# Patient Record
Sex: FEMALE | Race: WHITE | Employment: FULL TIME | URBAN - NONMETROPOLITAN AREA
[De-identification: names, ages, dates, MRNs, and addresses within clinical notes are randomized per-mention and may not be internally consistent; named-entity substitution may affect disease eponyms.]

---

## 2022-08-23 ENCOUNTER — OFFICE VISIT (OUTPATIENT)
Dept: PRIMARY CARE CLINIC | Age: 52
End: 2022-08-23

## 2022-08-23 VITALS
RESPIRATION RATE: 14 BRPM | WEIGHT: 180 LBS | TEMPERATURE: 98 F | HEART RATE: 74 BPM | SYSTOLIC BLOOD PRESSURE: 118 MMHG | OXYGEN SATURATION: 98 % | DIASTOLIC BLOOD PRESSURE: 70 MMHG | BODY MASS INDEX: 30.73 KG/M2 | HEIGHT: 64 IN

## 2022-08-23 DIAGNOSIS — M18.11 PRIMARY OSTEOARTHRITIS OF FIRST CARPOMETACARPAL JOINT OF RIGHT HAND: Primary | ICD-10-CM

## 2022-08-23 DIAGNOSIS — M85.60 SUBCHONDRAL CYST: ICD-10-CM

## 2022-08-23 RX ORDER — METHYLPREDNISOLONE 4 MG/1
TABLET ORAL
Qty: 1 KIT | Refills: 0 | Status: SHIPPED | OUTPATIENT
Start: 2022-08-23

## 2022-08-23 RX ORDER — ACETAMINOPHEN 500 MG
500 TABLET ORAL EVERY 6 HOURS PRN
COMMUNITY

## 2022-08-23 RX ORDER — IBUPROFEN 200 MG
200 TABLET ORAL EVERY 6 HOURS PRN
COMMUNITY

## 2022-08-23 SDOH — ECONOMIC STABILITY: FOOD INSECURITY: WITHIN THE PAST 12 MONTHS, YOU WORRIED THAT YOUR FOOD WOULD RUN OUT BEFORE YOU GOT MONEY TO BUY MORE.: NEVER TRUE

## 2022-08-23 SDOH — ECONOMIC STABILITY: FOOD INSECURITY: WITHIN THE PAST 12 MONTHS, THE FOOD YOU BOUGHT JUST DIDN'T LAST AND YOU DIDN'T HAVE MONEY TO GET MORE.: NEVER TRUE

## 2022-08-23 ASSESSMENT — SOCIAL DETERMINANTS OF HEALTH (SDOH): HOW HARD IS IT FOR YOU TO PAY FOR THE VERY BASICS LIKE FOOD, HOUSING, MEDICAL CARE, AND HEATING?: NOT HARD AT ALL

## 2022-08-23 ASSESSMENT — ENCOUNTER SYMPTOMS: COLOR CHANGE: 0

## 2022-08-23 NOTE — PROGRESS NOTES
[address]  Dept: Maci Casanova (:  1970) is a 50 y.o. female,New patient, here for evaluation of the following chief complaint(s):  No chief complaint on file. ASSESSMENT/PLAN:  1. Primary osteoarthritis of first carpometacarpal joint of right hand  -     External Referral To Orthopedic Surgery  -     methylPREDNISolone (MEDROL, COLLINS,) 4 MG tablet; Take by mouth as directed per package instructions. , Disp-1 kit, R-0Normal  2. Subchondral cyst  -     External Referral To Orthopedic Surgery  -     methylPREDNISolone (MEDROL, COLLINS,) 4 MG tablet; Take by mouth as directed per package instructions. , Disp-1 kit, R-0Normal    Return if symptoms worsen or fail to improve. : right 51.2,left 32.6  Push right 159, left 125  Pull right 101, left 89    SUBJECTIVE/OBJECTIVE:  Pt is here for hand pain. More prominent in her right hand that is her dominant hand  She has prior history of Oa of the triscaphe joint, and metacarpal joints and was advised to go to therapy or hand specialist  Today she is requesting referral to hand specialist because her discomfort and swelling is getting worse. 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 She had a prior  claim for this pain about one year ago with [facility] but claim was denied as x ry showed Primary osteoarthrosis, cysts and osteophytes. Also has B CT surgery of B hands > 15 years ago        Review of Systems   Constitutional:  Negative for activity change. Musculoskeletal:  Positive for arthralgias, joint swelling and myalgias. Right hand   Skin:  Negative for color change and rash. Neurological:  Positive for weakness. Physical Exam  Constitutional:       Appearance: Normal appearance. Cardiovascular:      Rate and Rhythm: Normal rate.       Pulses: Normal pulses. Pulmonary:      Effort: Pulmonary effort is normal.   Musculoskeletal:         General: Swelling present. Right wrist: Swelling present. Left wrist: Normal.      Right hand: Swelling, tenderness and bony tenderness present. Normal range of motion. Normal strength. Normal sensation. There is no disruption of two-point discrimination. Normal capillary refill. Comments: Swelling of triscaphe and metacarpal joint of thumb, thenar region   Skin:     General: Skin is warm and dry. Findings: No bruising or erythema. Neurological:      Mental Status: She is alert. Additional Information:    /70 (Site: Right Upper Arm, Position: Sitting)   Pulse 74   Temp 98 °F (36.7 °C) (Oral)   Resp 14   Ht 5' 4\" (1.626 m)   Wt 180 lb (81.6 kg)   LMP 08/23/2022   SpO2 98%   BMI 30.90 kg/m²     An electronic signature was used to authenticate this note.     --Osmel Aguilar, DOMINGUEZ - CNP

## 2022-11-28 ENCOUNTER — OFFICE VISIT (OUTPATIENT)
Dept: PRIMARY CARE CLINIC | Age: 52
End: 2022-11-28

## 2022-11-28 VITALS
RESPIRATION RATE: 18 BRPM | BODY MASS INDEX: 31.07 KG/M2 | DIASTOLIC BLOOD PRESSURE: 78 MMHG | WEIGHT: 182 LBS | HEIGHT: 64 IN | SYSTOLIC BLOOD PRESSURE: 118 MMHG | HEART RATE: 84 BPM | OXYGEN SATURATION: 98 %

## 2022-11-28 DIAGNOSIS — K02.9 DENTAL CARIES: Primary | ICD-10-CM

## 2022-11-28 DIAGNOSIS — K02.9 TOOTH DECAY: ICD-10-CM

## 2022-11-28 RX ORDER — PENICILLIN V POTASSIUM 500 MG/1
500 TABLET ORAL 3 TIMES DAILY
Qty: 21 TABLET | Refills: 0 | Status: SHIPPED | OUTPATIENT
Start: 2022-11-28 | End: 2022-12-05

## 2022-11-28 ASSESSMENT — ENCOUNTER SYMPTOMS
FACIAL SWELLING: 1
RESPIRATORY NEGATIVE: 1
SORE THROAT: 0

## 2022-11-28 NOTE — PATIENT INSTRUCTIONS
Rinse mouth frequently to cleanse   Over the counter anti inflammatory for pain swelling    Warm compress may help pain  ATB as prescribed  Dental provider Thursday

## 2022-11-28 NOTE — PROGRESS NOTES
[address]  Dept: [phone]  Dept Fax: [phone]  Loc Appt: [phone]  Loc Fax: [phone]    Reagan Mclaughlin is a 50 y.o. female who presents today for her medical conditions/complaints as noted below. Chief Complaint   Patient presents with    Dental Pain     Tooth pain, sees Dentist on Thursday           HPI:     [place] is here for tooth pain upper and lower molars  Recurrent tooth problem with decay  She tried calling her dental provider they are not able to see her until Thursday  Sees dentis in [facility]          Current Outpatient Medications   Medication Sig Dispense Refill    penicillin v potassium (VEETID) 500 MG tablet Take 1 tablet by mouth 3 times daily for 7 days 21 tablet 0    ibuprofen (ADVIL;MOTRIN) 200 MG tablet Take 200 mg by mouth every 6 hours as needed for Pain      acetaminophen (TYLENOL) 500 MG tablet Take 500 mg by mouth every 6 hours as needed for Pain      methylPREDNISolone (MEDROL, COLLINS,) 4 MG tablet Take by mouth as directed per package instructions. (Patient not taking: Reported on 11/28/2022) 1 kit 0     No current facility-administered medications for this visit. No Known Allergies    Subjective:      Review of Systems   Constitutional:  Negative for chills, diaphoresis and fever. HENT:  Positive for dental problem and facial swelling. Negative for ear discharge, ear pain, mouth sores and sore throat. Respiratory: Negative. Cardiovascular: Negative. Hematological:  Negative for adenopathy. Objective:     /78 (Site: Right Upper Arm, Position: Sitting, Cuff Size: Large Adult)   Pulse 84   Resp 18   Ht 5' 4\" (1.626 m)   Wt 182 lb (82.6 kg)   SpO2 98%   BMI 31.24 kg/m²     Physical Exam  Constitutional:       Appearance: Normal appearance. She is not ill-appearing. HENT:      Head: Normocephalic. Nose: No congestion. Mouth/Throat:      Mouth: No oral lesions or angioedema. Dentition: Dental tenderness, gingival swelling and dental caries present. Palate: No lesions. Pharynx: Oropharynx is clear. Eyes:      Pupils: Pupils are equal, round, and reactive to light. Neck:        Comments: palpable swelling  Musculoskeletal:      Cervical back: Full passive range of motion without pain. Lymphadenopathy:      Cervical: No cervical adenopathy. Neurological:      Mental Status: She is alert. Assessment:      1. Dental caries    2. Tooth decay    Started pt on dental care: mouth rinse suggested and PCN  Follow up with dentist Thursday or sooner for any worsening of s/s or seek emergent care for imaging and evaluation if any fever, chills increased facial swelling etc.         Plan:     Dental caries with tooth pain and swelling. + decay upper and lower molars right  Since she reports not able to see dentist will start Atb prophylaxis      Discussed use, benefit, and side effects of prescribed medications. Barriers to medication compliance addressed. All patient questions answered. Pt voiced understanding. Return thursday with dental provider. No orders of the defined types were placed in this encounter.     Orders Placed This Encounter   Medications    penicillin v potassium (VEETID) 500 MG tablet     Sig: Take 1 tablet by mouth 3 times daily for 7 days     Dispense:  21 tablet     Refill:  0           Electronically signed by DOMINGUEZ Ortiz CNP on 11/28/2022 at 1:06 PM

## 2023-01-11 ENCOUNTER — OFFICE VISIT (OUTPATIENT)
Dept: PRIMARY CARE CLINIC | Age: 53
End: 2023-01-11

## 2023-01-11 VITALS
SYSTOLIC BLOOD PRESSURE: 120 MMHG | HEIGHT: 64 IN | DIASTOLIC BLOOD PRESSURE: 72 MMHG | HEART RATE: 74 BPM | BODY MASS INDEX: 30.9 KG/M2 | TEMPERATURE: 98.3 F | OXYGEN SATURATION: 98 % | WEIGHT: 181 LBS | RESPIRATION RATE: 18 BRPM

## 2023-01-11 DIAGNOSIS — J00 ACUTE NASOPHARYNGITIS: Primary | ICD-10-CM

## 2023-01-11 ASSESSMENT — ENCOUNTER SYMPTOMS
RHINORRHEA: 1
GASTROINTESTINAL NEGATIVE: 1
SORE THROAT: 1
RESPIRATORY NEGATIVE: 1
EYES NEGATIVE: 1
SINUS PRESSURE: 1
TROUBLE SWALLOWING: 0
VOICE CHANGE: 0
FACIAL SWELLING: 0

## 2023-01-11 NOTE — PROGRESS NOTES
Iman Skaggs (:  1970) is a 50 y.o. female,Established patient, here for evaluation of the following chief complaint(s):  Sinus Problem (Started on Saturday, has tried tylenol cold/flu with no relief, productive cough with clear drainage, denies fever/chills)         ASSESSMENT/PLAN:  1. Acute nasopharyngitis    Return in about 2 weeks (around 2023), or Followup in 2 weeks if not feeling better or sooner if feeling worst.         Subjective   SUBJECTIVE/OBJECTIVE:  Patient presents to clinic with c/o not feeling well. Patient reports cough and runny nose started over the weekend, and phlegm and chest congestion started today. Patient reports her furnace stopped working and during repair it blew out a lot of soot. Also, patient expressed concerns for visiting 75 y/o mom during this period of illness. Patient repots she has treated her s/s with an antihistamine (generic) which she takes daily      Review of Systems   Constitutional:  Positive for fatigue. Negative for activity change, appetite change, chills, diaphoresis, fever and unexpected weight change. HENT:  Positive for congestion, postnasal drip, rhinorrhea, sinus pressure and sore throat (patient reports throat is scratchy). Negative for dental problem, drooling, ear discharge, ear pain, facial swelling, hearing loss, mouth sores, nosebleeds, sneezing, tinnitus, trouble swallowing and voice change. Eyes: Negative. Respiratory: Negative. Cardiovascular: Negative. Gastrointestinal: Negative. Endocrine: Negative. Genitourinary: Negative. Musculoskeletal: Negative. Skin: Negative. Allergic/Immunologic: Positive for environmental allergies. Negative for food allergies and immunocompromised state. Neurological: Negative. Hematological: Negative. Psychiatric/Behavioral: Negative. Objective   Physical Exam  Vitals and nursing note reviewed. HENT:      Head: Normocephalic.       Right Ear: Tympanic membrane, ear canal and external ear normal. There is no impacted cerumen. Left Ear: Tympanic membrane, ear canal and external ear normal. There is no impacted cerumen. Nose: Rhinorrhea present. Right Sinus: No maxillary sinus tenderness or frontal sinus tenderness. Left Sinus: No maxillary sinus tenderness or frontal sinus tenderness. Mouth/Throat:      Mouth: Mucous membranes are moist.      Pharynx: Posterior oropharyngeal erythema present. Tonsils: 1+ on the right. 1+ on the left. Comments: +PND  Eyes:      Conjunctiva/sclera: Conjunctivae normal.      Pupils: Pupils are equal, round, and reactive to light. Cardiovascular:      Rate and Rhythm: Normal rate and regular rhythm. Pulses: Normal pulses. Heart sounds: Normal heart sounds. Pulmonary:      Effort: Pulmonary effort is normal.      Breath sounds: Normal breath sounds. Abdominal:      General: Bowel sounds are normal.      Palpations: Abdomen is soft. Tenderness: There is no right CVA tenderness or left CVA tenderness. Musculoskeletal:         General: Normal range of motion. Cervical back: No tenderness. Lymphadenopathy:      Cervical: No cervical adenopathy. Skin:     General: Skin is warm and dry. Neurological:      General: No focal deficit present. Mental Status: She is alert and oriented to person, place, and time. Psychiatric:         Mood and Affect: Mood normal.         Behavior: Behavior normal.         Thought Content: Thought content normal.         Judgment: Judgment normal.                An electronic signature was used to authenticate this note.     --Colten Narvaez, DOMINGUEZ - CNP

## 2023-02-02 ENCOUNTER — OFFICE VISIT (OUTPATIENT)
Dept: PRIMARY CARE CLINIC | Age: 53
End: 2023-02-02

## 2023-02-02 VITALS
HEART RATE: 74 BPM | OXYGEN SATURATION: 99 % | BODY MASS INDEX: 30.9 KG/M2 | SYSTOLIC BLOOD PRESSURE: 114 MMHG | DIASTOLIC BLOOD PRESSURE: 72 MMHG | RESPIRATION RATE: 16 BRPM | TEMPERATURE: 97.8 F | HEIGHT: 64 IN | WEIGHT: 181 LBS

## 2023-02-02 DIAGNOSIS — J06.9 UPPER RESPIRATORY TRACT INFECTION, UNSPECIFIED TYPE: Primary | ICD-10-CM

## 2023-02-02 RX ORDER — BENZONATATE 100 MG/1
100 CAPSULE ORAL 3 TIMES DAILY PRN
Qty: 15 CAPSULE | Refills: 0 | Status: SHIPPED | OUTPATIENT
Start: 2023-02-02 | End: 2023-02-07

## 2023-02-02 RX ORDER — FLUCONAZOLE 150 MG/1
150 TABLET ORAL ONCE
Qty: 1 TABLET | Refills: 0 | Status: SHIPPED | OUTPATIENT
Start: 2023-02-02 | End: 2023-02-02

## 2023-02-02 RX ORDER — AZITHROMYCIN 250 MG/1
250 TABLET, FILM COATED ORAL SEE ADMIN INSTRUCTIONS
Qty: 6 TABLET | Refills: 0 | Status: SHIPPED | OUTPATIENT
Start: 2023-02-02 | End: 2023-02-07

## 2023-02-02 ASSESSMENT — ENCOUNTER SYMPTOMS
EYES NEGATIVE: 1
SINUS PRESSURE: 1
RESPIRATORY NEGATIVE: 1
TROUBLE SWALLOWING: 0
GASTROINTESTINAL NEGATIVE: 1
FACIAL SWELLING: 0
RHINORRHEA: 1
VOICE CHANGE: 0
SORE THROAT: 1

## 2023-02-02 NOTE — PROGRESS NOTES
Millie Norman (:  1970) is a 50 y.o. female,Established patient, here for evaluation of the following chief complaint(s):  Otalgia (Earache, sinus congestion/drainage. Was in here on  and had a couple days of relief. Symptoms started again yesterday )         ASSESSMENT/PLAN:  1. Upper respiratory tract infection, unspecified type  -     benzonatate (TESSALON PERLES) 100 MG capsule; Take 1 capsule by mouth 3 times daily as needed for Cough, Disp-15 capsule, R-0Normal  -     azithromycin (ZITHROMAX) 250 MG tablet; Take 1 tablet by mouth See Admin Instructions for 5 days 500mg on day 1 followed by 250mg on days 2 - 5, Disp-6 tablet, R-0Normal  -     POCT rapid strep A  -     fluconazole (DIFLUCAN) 150 MG tablet; Take 1 tablet by mouth once for 1 dose, Disp-1 tablet, R-0Normal    Return if symptoms worsen or fail to improve. Subjective   SUBJECTIVE/OBJECTIVE:  Patient presents to clinic with c/o not feeling well. Patient reports cough and runny nose started over the weekend, Clear phlegm and chest congestion started today. Sore throat, scratchy throat and sinus congestion with yellow nasal discharge. Has had bloody nasal discharge x 1    Had similar s/s 2 weeks ago ws seen advised to follow up if not improving in 2 weeks. She reports that her s/s seemed to be resolving but has been working alongside coworkers who are ill with similar s/s and symptoms returned worsening over 3-4 days. Patient repots she has treated her s/s with an antihistamine (generic) which she takes daily    Otalgia   Associated symptoms include rhinorrhea and a sore throat (patient reports throat is scratchy). Pertinent negatives include no ear discharge or hearing loss. tonsilllectomy, 2 weeks history of symptoms waxing and waning     Review of Systems   Constitutional:  Positive for fatigue. Negative for activity change, appetite change, chills, diaphoresis, fever and unexpected weight change.    HENT:  Positive for congestion, ear pain, postnasal drip, rhinorrhea, sinus pressure and sore throat (patient reports throat is scratchy). Negative for dental problem, drooling, ear discharge, facial swelling, hearing loss, mouth sores, nosebleeds, sneezing, tinnitus, trouble swallowing and voice change. Eyes: Negative. Respiratory: Negative. Cardiovascular: Negative. Gastrointestinal: Negative. Endocrine: Negative. Genitourinary: Negative. Musculoskeletal: Negative. Skin: Negative. Allergic/Immunologic: Positive for environmental allergies. Negative for food allergies and immunocompromised state. Neurological: Negative. Hematological: Negative. Psychiatric/Behavioral: Negative. Objective   Physical Exam  Vitals and nursing note reviewed. HENT:      Head: Normocephalic. Right Ear: Ear canal and external ear normal. No middle ear effusion. There is no impacted cerumen. Tympanic membrane is retracted. Tympanic membrane is not erythematous. Left Ear: Tympanic membrane, ear canal and external ear normal.  No middle ear effusion. There is no impacted cerumen. Tympanic membrane is not erythematous or bulging. Nose: Mucosal edema and rhinorrhea present. Right Turbinates: Swollen. Left Turbinates: Swollen. Right Sinus: No maxillary sinus tenderness or frontal sinus tenderness. Left Sinus: No maxillary sinus tenderness or frontal sinus tenderness. Mouth/Throat:      Mouth: Mucous membranes are moist.      Tongue: No lesions. Pharynx: Posterior oropharyngeal erythema present. No oropharyngeal exudate. Tonsils: 0 on the right. 0 on the left. Comments: +PND  H/O tonsillectomy  Eyes:      Conjunctiva/sclera: Conjunctivae normal.      Pupils: Pupils are equal, round, and reactive to light. Cardiovascular:      Rate and Rhythm: Normal rate and regular rhythm. Pulses: Normal pulses. Heart sounds: Normal heart sounds.    Pulmonary: Effort: Pulmonary effort is normal.      Breath sounds: Normal breath sounds. Abdominal:      General: Bowel sounds are normal.      Palpations: Abdomen is soft. Tenderness: There is no right CVA tenderness or left CVA tenderness. Musculoskeletal:         General: Normal range of motion. Cervical back: No tenderness. Lymphadenopathy:      Head:      Right side of head: No tonsillar adenopathy. Left side of head: No tonsillar adenopathy. Cervical: No cervical adenopathy. Skin:     General: Skin is warm and dry. Neurological:      General: No focal deficit present. Mental Status: She is alert and oriented to person, place, and time. Psychiatric:         Mood and Affect: Mood normal.         Behavior: Behavior normal.         Thought Content: Thought content normal.         Judgment: Judgment normal.        POCT Strep negative          An electronic signature was used to authenticate this note.     --DOMINGUEZ Baires - CNP

## 2023-02-02 NOTE — PATIENT INSTRUCTIONS
Nasal saline for nasal passage clearance. Antihistamine daily as previously taking    Salt water gargle 3 x day. follow up if not improving  Otc pain reliever

## 2023-02-07 ENCOUNTER — OFFICE VISIT (OUTPATIENT)
Dept: PRIMARY CARE CLINIC | Age: 53
End: 2023-02-07

## 2023-02-07 VITALS
WEIGHT: 181 LBS | RESPIRATION RATE: 16 BRPM | BODY MASS INDEX: 30.9 KG/M2 | OXYGEN SATURATION: 99 % | HEIGHT: 64 IN | HEART RATE: 72 BPM

## 2023-02-07 DIAGNOSIS — J06.9 UPPER RESPIRATORY TRACT INFECTION, UNSPECIFIED TYPE: Primary | ICD-10-CM

## 2023-02-07 DIAGNOSIS — R19.7 DIARRHEA, UNSPECIFIED TYPE: ICD-10-CM

## 2023-02-07 ASSESSMENT — ENCOUNTER SYMPTOMS
SINUS PAIN: 0
VOMITING: 0
ABDOMINAL PAIN: 1
RESPIRATORY NEGATIVE: 1
BLOOD IN STOOL: 0
BLOATING: 1
NAUSEA: 0
SINUS PRESSURE: 1
FLATUS: 1
DIARRHEA: 1

## 2023-02-07 NOTE — PATIENT INSTRUCTIONS
Stop ATB  Freeborn diet: gut rest: Instructions for BRAT diet    Follow up if diarrhea is not improving.     OTC for sinus uri cold symptoms

## 2023-02-07 NOTE — PROGRESS NOTES
[address]  Dept: [phone]  Dept Fax: [phone]  Loc Appt: [phone]  Loc Fax: [phone]    Harvey Anna is a 50 y.o. female who presents today for her medical conditions/complaints as noted below. Chief Complaint   Patient presents with    Abdominal Pain     GI upset, stopped ATB Friday evening. HPI:     50year old female following up for URI symptoms  Developed diarrhea with 2 doses of Aithromycin  Explosive watery diarrhea   Patient reports for history of irritable bowel  Reports has taken z sinai in past tolerated well. Abdominal Pain  Associated symptoms include diarrhea and flatus. Pertinent negatives include no fever, nausea, vomiting or weight loss. Her past medical history is significant for irritable bowel syndrome. Diarrhea   This is a new problem. The current episode started in the past 7 days. The problem has been gradually improving. The stool consistency is described as Watery. The patient states that diarrhea awakens her from sleep. Associated symptoms include abdominal pain, bloating, increased flatus and a URI. Pertinent negatives include no chills, fever, vomiting or weight loss. Risk factors include recent antibiotic use. She has tried nothing for the symptoms. Her past medical history is significant for irritable bowel syndrome.      Current Outpatient Medications   Medication Sig Dispense Refill    benzonatate (TESSALON PERLES) 100 MG capsule Take 1 capsule by mouth 3 times daily as needed for Cough 15 capsule 0    azithromycin (ZITHROMAX) 250 MG tablet Take 1 tablet by mouth See Admin Instructions for 5 days 500mg on day 1 followed by 250mg on days 2 - 5 6 tablet 0    ibuprofen (ADVIL;MOTRIN) 200 MG tablet Take 200 mg by mouth every 6 hours as needed for Pain      acetaminophen (TYLENOL) 500 MG tablet Take 500 mg by mouth every 6 hours as needed for Pain       No current facility-administered medications for this visit. No Known Allergies    Subjective:      Review of Systems   Constitutional:  Positive for appetite change. Negative for chills, fever and weight loss. HENT:  Positive for congestion, postnasal drip and sinus pressure. Negative for sinus pain. Respiratory: Negative. Cardiovascular: Negative. Gastrointestinal:  Positive for abdominal pain, bloating, diarrhea and flatus. Negative for blood in stool, nausea and vomiting. Musculoskeletal: Negative. Skin: Negative. Neurological: Negative. Objective:     Pulse 72   Resp 16   Ht 5' 4\" (1.626 m)   Wt 181 lb (82.1 kg)   SpO2 99%   BMI 31.07 kg/m²     Physical Exam  Constitutional:       Appearance: Normal appearance. She is not ill-appearing or diaphoretic. HENT:      Head: Normocephalic. Right Ear: Tympanic membrane normal.      Left Ear: Tympanic membrane normal.      Nose: Congestion present. Mouth/Throat:      Pharynx: No posterior oropharyngeal erythema. Eyes:      Pupils: Pupils are equal, round, and reactive to light. Cardiovascular:      Rate and Rhythm: Normal rate. Pulses: Normal pulses. Pulmonary:      Effort: Pulmonary effort is normal.   Abdominal:      General: Bowel sounds are normal.      Palpations: Abdomen is soft. Tenderness: There is generalized abdominal tenderness. Hernia: No hernia is present. Skin:     General: Skin is warm and dry. Capillary Refill: Capillary refill takes less than 2 seconds. Neurological:      Mental Status: She is alert. Psychiatric:         Behavior: Behavior normal.         Assessment:      1. Upper respiratory tract infection, unspecified type    - POCT Influenza A/B    2.  Diarrhea, unspecified type    - POCT Influenza A/B       Plan:     Viral illness  Influenza negative x 2  Possible gastroenteritis or diarrhea with recent ATB use    Pt had stopped Z sinai after 2 doses, advised to continue to hold. Treat URI symptoms with otc conservative treatment. Explained reasoning behind suggestion to hold atb with suspicion for Viral pathology  If diarrhea persists will obtain stool for C diff    Discussed use, benefit, and side effects of prescribed medications. Barriers to medication compliance addressed. All patient questions answered. Pt voiced understanding. Return if symptoms worsen or fail to improve. Orders Placed This Encounter   Procedures    POCT Influenza A/B     No orders of the defined types were placed in this encounter.           Electronically signed by DOMINGUEZ Guerra CNP on 2/7/2023 at 1:02 PM